# Patient Record
Sex: FEMALE | Race: OTHER | HISPANIC OR LATINO | URBAN - METROPOLITAN AREA
[De-identification: names, ages, dates, MRNs, and addresses within clinical notes are randomized per-mention and may not be internally consistent; named-entity substitution may affect disease eponyms.]

---

## 2018-04-09 ENCOUNTER — EMERGENCY (EMERGENCY)
Facility: HOSPITAL | Age: 38
LOS: 1 days | Discharge: HOME | End: 2018-04-09
Attending: EMERGENCY MEDICINE
Payer: MEDICAID

## 2018-04-09 VITALS
DIASTOLIC BLOOD PRESSURE: 79 MMHG | TEMPERATURE: 98 F | OXYGEN SATURATION: 100 % | RESPIRATION RATE: 18 BRPM | HEART RATE: 78 BPM | SYSTOLIC BLOOD PRESSURE: 133 MMHG

## 2018-04-09 PROCEDURE — 99283 EMERGENCY DEPT VISIT LOW MDM: CPT

## 2018-04-09 PROCEDURE — 73030 X-RAY EXAM OF SHOULDER: CPT

## 2018-04-09 PROCEDURE — 99283 EMERGENCY DEPT VISIT LOW MDM: CPT | Mod: 25

## 2018-04-09 PROCEDURE — 73030 X-RAY EXAM OF SHOULDER: CPT | Mod: 26,RT

## 2018-04-09 RX ORDER — IBUPROFEN 200 MG
1 TABLET ORAL
Qty: 20 | Refills: 0 | OUTPATIENT
Start: 2018-04-09 | End: 2018-04-13

## 2018-04-09 NOTE — ED PROVIDER NOTE - ATTENDING CONTRIBUTION TO CARE
Pt. with tenderness to right shoulder. NO acute distress. Pain with ROM of shoulder. I have discussed the plan with the ACP.

## 2018-04-09 NOTE — ED PROVIDER NOTE - MEDICAL DECISION MAKING DETAILS
39 y/o F pt presents c/o traumatic R shoulder pain. Well-appearing., NV intact. Will get X-ray. Ibuprofen. Ortho follow up. D/c.

## 2018-04-09 NOTE — ED PROVIDER NOTE - MUSCULOSKELETAL, MLM
Spine appears normal. slightly limited ROM to R shoulder.  No clavicular or AC joint tenderness -pain worse w/ belly press and empty can test. No spinal/ paraspinal tenderness. R elbow w/ full ROM, no ttp, no swelling,. Spine appears normal. slightly limited ROM to R shoulder with internal rotation.  No clavicular or AC joint tenderness -pain worse w/ belly press and empty can test. No spinal/ paraspinal tenderness. R elbow w/ full ROM, no ttp, no swelling,.

## 2018-04-09 NOTE — ED ADULT NURSE NOTE - OBJECTIVE STATEMENT
pt is a 37 y/o female with c/o  shoulder pain/injury  about 2 weeks ago slipped on a black ice. afetr the fall she  did not pay attention to it then for the past two weeks pain is on and off pt able to move  rt arm, with no signs of difficulty noted.

## 2018-04-09 NOTE — ED PROVIDER NOTE - PROGRESS NOTE DETAILS
Xray negative for fracture. History and findings suggestive of contusion vs possible rotator cuff injury. Will dc with ortho follow up within 1 week. Pt is well appearing walking with steady gait, stable for discharge and follow up without fail with medical doctor. I had a detailed discussion with the patient and/or guardian regarding the historical points, exam findings, and any diagnostic results supporting the discharge diagnosis. Pt educated on care and need for follow up. Strict return instructions and red flag signs and symptoms discussed with patient. Questions answered. Pt shows understanding of discharge information and agrees to follow.

## 2018-08-02 NOTE — ED PROVIDER NOTE - SKIN, MLM
Recommendation Preamble: The following recommendations were made during the visit: Detail Level: Zone Recommendations (Free Text): Consult with Kimberlee re: IPL. Skin normal color for race, warm, dry and intact. No evidence of rash.

## 2025-06-02 NOTE — ED PROVIDER NOTE - OBJECTIVE STATEMENT
[facility] called requesting a refill on the following medications:  Requested Prescriptions     Pending Prescriptions Disp Refills    rosuvastatin (CRESTOR) 20 MG tablet 90 tablet 3     Sig: Take 1 tablet by mouth nightly       Date of last visit: 5/22/2025  Date of next visit (if applicable):Visit date not found  Date of last refill: 5/28/24  Pharmacy Name: [facility] MA      
 #[id number].  39 y/o F w/ no PMhx presents c/o R shoulder pain x 2 weeks. 2 weeks ago, slipped on black ice and landed on R side directly on the elbow.  Since then sharp, constant pain to R shoulder, worse w/ movement. Denies numbness, tingling, focal weakness, neck pain, back pain, and any other complaints.